# Patient Record
Sex: MALE | Race: ASIAN | Employment: FULL TIME | URBAN - METROPOLITAN AREA
[De-identification: names, ages, dates, MRNs, and addresses within clinical notes are randomized per-mention and may not be internally consistent; named-entity substitution may affect disease eponyms.]

---

## 2020-12-03 ENCOUNTER — OFFICE VISIT (OUTPATIENT)
Dept: NEUROLOGY | Age: 25
End: 2020-12-03
Payer: COMMERCIAL

## 2020-12-03 VITALS
HEIGHT: 72 IN | WEIGHT: 194 LBS | OXYGEN SATURATION: 98 % | RESPIRATION RATE: 16 BRPM | SYSTOLIC BLOOD PRESSURE: 130 MMHG | BODY MASS INDEX: 26.28 KG/M2 | HEART RATE: 78 BPM | DIASTOLIC BLOOD PRESSURE: 70 MMHG

## 2020-12-03 DIAGNOSIS — G40.909 NONINTRACTABLE EPILEPSY WITHOUT STATUS EPILEPTICUS, UNSPECIFIED EPILEPSY TYPE (HCC): Primary | ICD-10-CM

## 2020-12-03 PROCEDURE — 99204 OFFICE O/P NEW MOD 45 MIN: CPT | Performed by: PSYCHIATRY & NEUROLOGY

## 2020-12-03 RX ORDER — LEVETIRACETAM 500 MG/1
1000 TABLET ORAL 2 TIMES DAILY
Qty: 120 TAB | Refills: 5 | Status: SHIPPED | OUTPATIENT
Start: 2020-12-03 | End: 2021-12-09 | Stop reason: SDUPTHER

## 2020-12-03 RX ORDER — LEVETIRACETAM 500 MG/1
1000 TABLET ORAL 2 TIMES DAILY
COMMUNITY
End: 2020-12-03 | Stop reason: SDUPTHER

## 2020-12-03 NOTE — PROGRESS NOTES
Neurology Consult Note      HISTORY PROVIDED BY: patient    Chief Complaint:   Chief Complaint   Patient presents with    Epilepsy      Subjective:    Marga Magdaleno is a 22 y.o. right handed male who presents in consultation for epilepsy. Pt reports h/o epilepsy for 6-7 years, diagnosed while living in [facility] at age 14yo. He was started on Keppra 500mg bid. He recalls having an MRI brain at diagnosis, told he had \"clotting in brain\", but had another MRI a few years ago in [place] that was normal. EEG showed a \"slight disturbance in electric waves. \" His last sz was in August, 2018. Often triggered by stress. Typical seizure consists of a warning of feeling like he is going to pass out, about 5 seconds later he has YAA, he becomes aware again after 20 minutes, takes about 5 minutes before he recognizes people around him. He has been told he falls to ground, shakes for 5 minutes probably, will then stand up and move around, tries to get away from people, will talk, but has no memory the events. No tongue biting. No b/b incontinence. No family h/o epilepsy, no h/o febrile sz, no h/o head injury, no h/o CNS infection, nl preg/del. Also mentions that he slipped on stairs and hit head on wall recently. He dented the wall, no LOC. That same day he hit his head on a door, no LOC. Occasionally will feel numbness in left leg since hitting his head. Also mentions feeling \"rodriguez\" going through his body from head to toe and feels like he is going to black out, is able to abort the LOC. He mentions at end of visit that he has taken a PRN med for insomnia due to anxiety. Past Medical History:   Diagnosis Date    Epilepsy [facility])       History reviewed. No pertinent surgical history.    Social History     Socioeconomic History    Marital status: Not on file     Spouse name: Not on file    Number of children: Not on file    Years of education: Not on file    Highest education level: Not on file   Occupational History  Occupation:  for [employer]   Social Needs    Financial resource strain: Not on file    Food insecurity     Worry: Not on file     Inability: Not on file   [employer] needs     Medical: Not on file     Non-medical: Not on file   Tobacco Use    Smoking status: Never Smoker    Smokeless tobacco: Never Used   Substance and Sexual Activity    Alcohol use: Never     Frequency: Never    Drug use: Never    Sexual activity: Not on file   Lifestyle    Physical activity     Days per week: Not on file     Minutes per session: Not on file    Stress: Not on file   Relationships    Social connections     Talks on phone: Not on file     Gets together: Not on file     Attends [employer]ist service: Not on file     Active member of club or organization: Not on file     Attends meetings of clubs or organizations: Not on file     Relationship status: Not on file    Intimate partner violence     Fear of current or ex partner: Not on file     Emotionally abused: Not on file     Physically abused: Not on file     Forced sexual activity: Not on file   Other Topics Concern    Not on file   Social History Narrative    Living with friends in [place]     Family History   Problem Relation Age of Onset    No Known Problems Mother     No Known Problems Father     No Known Problems Sister        Objective:   Review of Systems   Constitutional: Negative. HENT: Negative. Eyes: Negative. Respiratory: Negative. Snores, no apneas   Cardiovascular: Negative. Gastrointestinal: Negative. Genitourinary: Negative. Musculoskeletal: Negative. Skin: Negative. Neurological: Positive for headaches (No N/V, P/P). Endo/Heme/Allergies: Negative. Psychiatric/Behavioral: The patient is nervous/anxious. No Known Allergies     Meds:  Outpatient Medications Prior to Visit   Medication Sig Dispense Refill    levETIRAcetam (KEPPRA) 500 mg tablet Take  by mouth two (2) times a day.        No facility-administered medications prior to visit. Imaging:  MRI Results (most recent):  No results found for this or any previous visit. CT Results (most recent):  No results found for this or any previous visit. Reviewed records in [employer] [facility] [employer] tab today    Lab Review   No results found for this or any previous visit. Exam:  Visit Vitals  /70 (BP 1 Location: Left arm, BP Patient Position: Sitting)   Pulse 78   Resp 16   Ht 6' (1.829 m)   Wt 194 lb (88 kg)   SpO2 98%   BMI 26.31 kg/m²     General:  Alert, cooperative, no distress. Head:  Normocephalic, without obvious abnormality, atraumatic. Respiratory:  Heart:   Non labored breathing  Regular rate and rhythm, no murmurs   Neck:   2+ carotids, no bruits   Extremities: Warm, no cyanosis or edema. Pulses: 2+ radial pulses. Neurologic:  MS: Alert and oriented x 4, speech intact. Language intact. Attention and fund of knowledge appropriate. Recent and remote memory intact. Cranial Nerves:  II: visual fields Full to confrontation   II: pupils Equal, round, reactive to light   II: optic disc    III,VII: ptosis none   III,IV,VI: extraocular muscles  EOMI, no nystagmus or diplopia   V: facial light touch sensation  normal   VII: facial muscle function   symmetric   VIII: hearing intact   IX: soft palate elevation  normal   XI: trapezius strength  5/5   XI: sternocleidomastoid strength 5/5   XII: tongue  Midline     Motor: normal bulk and tone, no tremor              Strength: 5/5 throughout, no PD  Sensory: intact to LT, PP  Coordination: FTN and HTS intact, CARLOS ALBERTO intact  Gait: normal gait, able to tandem walk  Reflexes: 2+ symmetric, toes downgoing           Assessment/Plan   Pt is a 22 y.o. right handed male diagnosed with epilepsy at age 14yo while living in [facility], epilepsy type or etiology is not known, no known seizure risk factors. Last seizure in August, 2018.   Typical seizure consists of a warning of feeling like he is going to pass out, about 5 seconds before having YAA/LOC, has been told he falls to ground and shakes for 5 minutes, will then stand up and move around, tries to get away from people, will talk, but has no memory these events. it takes 20 minutes for him to come to and it takes about 5 minutes before he recognizes people around him. May be triggered by stress. Currently on Keppra 1000mg bid. Exam is non-focal and unremarkable. Discussed establishing diagnosis or etiology if possible. Will order EEG. Will defer MRI given prior imaging and normal exam, pending EEG results. Pt is instructed to call after EEG is completed. Continue Keppra 1000mg bid. Discussed driving regulations in [place], no driving until seizure free x 6 months if he were to have a breakthrough seizure. F/u in clinic in 6 months, instructed to call if needed in the interim. ICD-10-CM ICD-9-CM    1. Nonintractable epilepsy without status epilepticus, unspecified [facility]  G40.909 345.90 EEG       Signed:   Herve Diaz MD  12/3/2020

## 2020-12-14 ENCOUNTER — HOSPITAL ENCOUNTER (OUTPATIENT)
Dept: NEUROLOGY | Age: 25
Discharge: HOME | End: 2020-12-14
Attending: PSYCHIATRY & NEUROLOGY
Payer: COMMERCIAL

## 2020-12-14 DIAGNOSIS — G40.909 NONINTRACTABLE EPILEPSY WITHOUT STATUS EPILEPTICUS, UNSPECIFIED EPILEPSY TYPE (HCC): ICD-10-CM

## 2020-12-14 PROCEDURE — 95816 EEG AWAKE AND DROWSY: CPT

## 2020-12-14 PROCEDURE — 95819 EEG AWAKE AND ASLEEP: CPT | Performed by: PSYCHIATRY & NEUROLOGY

## 2021-01-12 ENCOUNTER — TELEPHONE (OUTPATIENT)
Dept: NEUROLOGY | Age: 26
End: 2021-01-12

## 2021-01-13 NOTE — TELEPHONE ENCOUNTER
[facility] - Please call pt: EEG 12/14/20 was normal.  As we discussed, a normal EEG does not exclude a diagnosis of epilepsy. We can discuss further at f/u appt.

## 2021-01-13 NOTE — PROCEDURES
PROCEDURE: ROUTINE OUTPATIENT EEG  NAME:   Celina Zamora  ACCOUNT NUMBER : [de-identified]  MRN:   [id number]  DATE OF SERVICE: 12/14/20    HISTORY/INDICATION: Pt is a 22yo male with diagnosis of epilepsy at age 12yo, type of epilepsy or etiology not known. EEG is performed to help establish diagnosis. MEDICATIONS:   Current Outpatient Medications   Medication Sig Dispense Refill    levETIRAcetam (KEPPRA) 500 mg tablet Take 2 Tabs by mouth two (2) times a day. 120 Tab 5       CONDITIONS OF RECORDING: This is a routine 21-channel EEG recording performed in accordance with the international 10-20 system with one channel devoted to limited EKG. This study was done during states of wakefulness and sleep. Photic stimulation and hyperventilation were performed as activating procedures. DESCRIPTION:   Upon maximal arousal the posterior dominant rhythm has a frequency of 8.5Hz with an amplitude of 40uV. This activity is symmetric over the bilateral posterior derivations and attenuates with eye opening. Photic stimulation and hyperventilation do not significantly alter the tracing. Normal sleep architecture is seen with stage II sleep recognized by the presence of symmetric vertex waves and sleep spindles. There are no focal abnormalities, epileptiform discharges, or electrographic seizures seen. INTERPRETATION: Normal awake and asleep EEG    CLINICAL CORRELATION: A normal EEG does not definitively exclude a diagnosis of epilepsy if clinical suspicion is high consider sleep deprived EEG.      Tomás Robertson MD

## 2021-04-09 ENCOUNTER — TELEPHONE (OUTPATIENT)
Dept: NEUROLOGY | Age: 26
End: 2021-04-09

## 2021-04-09 NOTE — TELEPHONE ENCOUNTER
Spoke with pt. Pt stated on March 29 he was in [place] and was rush to hospital. He stated he was having a weird feeling while eating. It was a shivering feeling pt says it feels like a seizure lasting for 15 mins but he was able to control his body and was aware of his surrounding. He states this is a continuing feeling and it mainly happens at night when eating. When pt was at hospital in [place] had a CT Scan done Pt stated results were normal. Pt was told to contact his neurologist. Pt has a appt with Dr. Bello Arias in June but would like to be seen sooner.

## 2021-04-09 NOTE — TELEPHONE ENCOUNTER
Please advise    ----- Message from Bianca Rivera sent at 4/9/2021  3:09 PM EDT -----  Regarding: /telephone  General Message/Vendor Calls    Caller's first and last name:      Reason for call: The pt would like to know if there is a way his follow up could possibly be rescheduled to a sooner date. Callback required yes/no and why:Yes.       Best contact number(s):[phone]

## 2021-04-09 NOTE — TELEPHONE ENCOUNTER
Patient calling because he would like to be seen before his appt on 6/3. He had a medical issue recently and feels he needs to be seen ASAP. Can he see NP? Please advise.

## 2021-04-15 ENCOUNTER — OFFICE VISIT (OUTPATIENT)
Dept: NEUROLOGY | Age: 26
End: 2021-04-15
Payer: COMMERCIAL

## 2021-04-15 VITALS
HEART RATE: 91 BPM | DIASTOLIC BLOOD PRESSURE: 70 MMHG | SYSTOLIC BLOOD PRESSURE: 130 MMHG | OXYGEN SATURATION: 96 % | WEIGHT: 196.6 LBS | BODY MASS INDEX: 26.63 KG/M2 | HEIGHT: 72 IN

## 2021-04-15 DIAGNOSIS — G40.909 NONINTRACTABLE EPILEPSY WITHOUT STATUS EPILEPTICUS, UNSPECIFIED EPILEPSY TYPE (HCC): Primary | ICD-10-CM

## 2021-04-15 PROCEDURE — 99214 OFFICE O/P EST MOD 30 MIN: CPT | Performed by: PSYCHIATRY & NEUROLOGY

## 2021-04-15 RX ORDER — SERTRALINE HYDROCHLORIDE 25 MG/1
25 TABLET, FILM COATED ORAL DAILY
Qty: 90 TAB | Refills: 1 | Status: SHIPPED | OUTPATIENT
Start: 2021-04-15

## 2021-04-15 NOTE — PROGRESS NOTES
Neurology Consult Note      HISTORY PROVIDED BY: patient    Chief Complaint:   Chief Complaint   Patient presents with    Follow-up    Seizure      Subjective:   Pt is a 22 y.o. right handed male initially and last seen on 12/3/20, diagnosed with epilepsy at age 14yo while living in [facility], epilepsy type or etiology is not known, no known seizure risk factors. Last seizure in August, 2018. Typical seizure consists of a warning of feeling like he is going to pass out, about 5 seconds before having YAA/LOC, has been told he falls to ground and shakes for 5 minutes, will then stand up and move around, tries to get away from people, will talk, but has no memory these events. it takes 20 minutes for him to come to and it takes about 5 minutes before he recognizes people around him. May be triggered by stress. Taking Keppra 1000mg bid. Exam was non-focal and unremarkable. Discussed establishing diagnosis or etiology if possible. Ordered EEG, deferred MRI given prior imaging and normal exam, pending EEG results. Pt was instructed to call after EEG is completed. Continued Keppra 1000mg bid. Discussed driving regulations in [facility], no driving until seizure free x 6 months if he were to have a breakthrough seizure. He returns for f/u. EEG 12/14/20 was normal. Pt returns for earlier than planned f/u due to possible seizure while in [place] visiting friends. According to ED note 3/29/21, he had shivering on the inside x 30 minutes, felt like a seizure was coming on and instructed a friend to call EMS. He never had YAA/LOC with the event. CTH was normal. Keppra level was 23. CMP was unremarkable. CBC with WBC count 12.1. Pt was given Keppra 1000mg IV x 1 and discharged. The pt tells me that he had a panic attack, he was in [place], was feeling a weird feeling when eating especially for the previous couple of days.  About 7 out of 8 times in the past he had a seizure while eating, so he will often have anxiety feeling while eating, but this time was more severe. He wonders if this is related to new environment, change in job, spicy food, and not working out while in [place]. Typically when he has this feeling he will try to lie down. Was having this rarely since 2018 until last couple of months, occurring more frequently. With this one, it was more severe and continued despite lying down, felt like he might suffocate, tried his best not to pass out, has in the past. He felt like he was shivering on the inside and heart was racing. Lasted about 15-20 minutes in total. As soon as he reached the ED he was good again. He is not certain if this is a panic attack or an aura, b/c with his seizures he will have these sxs then have LOC. Had not missed any Keppra doses, not ill. He was having increased anxiety that day related to getting a test result for work back. He has been on anxiety medication in the past and it made him feel better. He is seeing an endocrinologist, Dr. Neva Boucher at [facility], his prolactin level was elevated x 2, as high as 37 and had an elevated to Alk phos. He has an  MRI brain/pituitary ordered. Past Medical History:   Diagnosis Date    Epilepsy ([facility]       No past surgical history on file.    Social History     Socioeconomic History    Marital status: SINGLE     Spouse name: Not on file    Number of children: Not on file    Years of education: Not on file    Highest education level: Not on file   Occupational History    Occupation:  for [employer]   Social Needs    Financial resource strain: Not on file    Food insecurity     Worry: Not on file     Inability: Not on file   [employer] needs     Medical: Not on file     Non-medical: Not on file   Tobacco Use    Smoking status: Never Smoker    Smokeless tobacco: Never Used   Substance and Sexual Activity    Alcohol use: Never     Frequency: Never    Drug use: Never    Sexual activity: Not on file   Lifestyle    Physical activity     Days per week: Not on file     Minutes per session: Not on file    Stress: Not on file   Relationships    Social connections     Talks on phone: Not on file     Gets together: Not on file     Attends Cheondoism service: Not on file     Active member of club or organization: Not on file     Attends meetings of clubs or organizations: Not on file     Relationship status: Not on file    Intimate partner violence     Fear of current or ex partner: Not on file     Emotionally abused: Not on file     Physically abused: Not on file     Forced sexual activity: Not on file   Other Topics Concern    Not on file   Social History Narrative    Living with friends in [place]     Family History   Problem Relation Age of Onset    No Known Problems Mother     No Known Problems Father     No Known Problems Sister        Objective:   ROS: Per HPI , o/w reviewed and negative. No Known Allergies     Meds:  Outpatient Medications Prior to Visit   Medication Sig Dispense Refill    levETIRAcetam (KEPPRA) 500 mg tablet Take 2 Tabs by mouth two (2) times a day. 120 Tab 5     No facility-administered medications prior to visit. Imaging:  MRI Results (most recent):  No results found for this or any previous visit. CT Results (most recent):  No results found for this or any previous visit. Reviewed records in [employer] tab today    Lab Review   No results found for this or any previous visit. Exam:  Visit Vitals  /70   Pulse 91   Ht 6' (1.829 m)   Wt 196 lb 9.6 oz (89.2 kg)   SpO2 96%   BMI 26.66 kg/m²     General:  Alert, cooperative, no distress. Head:  Normocephalic, without obvious abnormality, atraumatic. Respiratory:  Heart:   Non labored breathing  Regular rhythm, tachycardic, no murmurs   Neck:      Extremities: Warm, no cyanosis or edema. Pulses: 2+ radial pulses. Neurologic:  MS: Alert and oriented x 4, speech intact. Language intact.  Attention and fund of knowledge appropriate. Recent and remote memory intact. Cranial Nerves:  II: visual fields    II: pupils    II: optic disc    III,VII: ptosis none   III,IV,VI: extraocular muscles  EOMI, no nystagmus or diplopia   V: facial light touch sensation     VII: facial muscle function   symmetric   VIII: hearing intact   IX: soft palate elevation     XI: trapezius strength     XI: sternocleidomastoid strength    XII: tongue     Gait: normal gait  Exam 12/3/20:  Motor: normal bulk and tone, no tremor              Strength: 5/5 throughout, no PD  Sensory: intact to LT, PP  Coordination: FTN and HTS intact, CARLOS ALBERTO intact  Reflexes: 2+ symmetric, toes downgoing       Assessment/Plan   Pt is a 22 y.o. right handed male initially presenting 12/3/20, diagnosed with epilepsy at age 12yo while living in [facility], epilepsy type or etiology is not known, no known seizure risk factors. Last seizure in August, 2018. Typical seizure consists of a warning of feeling like he is going to pass out, about 5 seconds before having YAA/LOC, has been told he falls to ground and shakes for 5 minutes, will then stand up and move around, tries to get away from people, will talk, but has no memory these events. it takes 20 minutes for him to come to and it takes about 5 minutes before he recognizes people around him. May be triggered by stress and adds that several have occurred while eating. Taking Keppra 1000mg bid. Had a spell on 3/29/21 while eating in which he felt like he was shivering on the inside and like he might suffocate and heart was racing, it felt like a seizure was coming on vs anxiety that he may have a seizure while eating. He was under increased stress during this time. CTH was normal, Keppra level 23, WBC 12.1, o/w labs normal.  EEG 12/14/20 was normal.   Exam is non-focal and unremarkable. Unclear if this was an aura or panic attack.  Given h/o anxiety and previous improvement with treatment, will start Zoloft 25mg daily, side effects reviewed. Will await MRI brain/pituitary results. Continue Keppra 1000mg bid. Discussed driving regulations in [place], no driving until seizure free x 6 months if he were to have a breakthrough seizure. F/u in clinic in 3 months, instructed to call if needed in the interim. ICD-10-CM ICD-9-CM    1. Nonintractable epilepsy without status epilepticus, unspecified epilepsy type ([facility]  G40.909 345.90        Signed:   Jose Knowles MD  4/15/2021

## 2021-05-04 ENCOUNTER — TELEPHONE (OUTPATIENT)
Dept: NEUROLOGY | Age: 26
End: 2021-05-04

## 2021-05-04 NOTE — TELEPHONE ENCOUNTER
CHRISTOPHER Urbina 2 - 1) Document that you spoke to the pt and what was said. 2) Call the facility that did the imaging and see if they will send a copy of the report to me.

## 2021-05-04 NOTE — TELEPHONE ENCOUNTER
----- Message from Miguel Navarro sent at 5/4/2021 11:05 AM EDT -----  Regarding: Dr. Ade Fernandez  General Message/Vendor Calls    Caller's first and last name:   PT      Reason for call:  Requesting the office contact [phone] to request a copy of pt's MRI of Brain with and without contrast done on 04/29/21      Callback required yes/no and why:  Yes, confirming the copy was received      Best contact number(s):[phone]      Details to clarify the request:   Pt stated,  he received the CD       Miguel Navarro

## 2021-05-05 NOTE — TELEPHONE ENCOUNTER
Called the [facility] and spoke w/ Byron Mojica regarding obtaining a copy of pt's MRI of the Brain that was done on 4/29/21. She stated that the pt should call ahead in order to get copies ready or the pt needs to come to the Neurology clinic and sign a medical release form. Called pt and inform him of the message from Byron Mojica from [facility] stated (either go there to get the copies or come to Neurology clin to sign medical release form) Pt stated that he will bring the cd of his MRI by the office.

## 2021-05-05 NOTE — TELEPHONE ENCOUNTER
Called pt on 5/4/21. Pt stated that our office has to contact [facility] regarding a copy of pt's MRI of the Brain that was done on 4/29/21. Pt stated that he has the cd.

## 2021-05-06 NOTE — TELEPHONE ENCOUNTER
Charito Malin - Please have him sign a release when he comes by so that we can request the official report.

## 2021-05-06 NOTE — TELEPHONE ENCOUNTER
Called pt regarding signing a medical release form to received official report of MRI. Pt stated that he will be here next week to sign form.  Inform him that the form will be up front in Dr. Mario Serrano folder and to ask the PSR's

## 2021-05-11 ENCOUNTER — TELEPHONE (OUTPATIENT)
Dept: NEUROLOGY | Age: 26
End: 2021-05-11

## 2021-05-11 NOTE — TELEPHONE ENCOUNTER
Pt called stating he had an MRI done ordered by a different doctor and he talk to Dr. Kevin Moore when he was in the office and she said that she would like to see the results, he said he has a fax number if you could call back if you find the MRI results

## 2021-05-16 ENCOUNTER — HOSPITAL ENCOUNTER (EMERGENCY)
Age: 26
Discharge: HOME | End: 2021-05-17
Attending: EMERGENCY MEDICINE
Payer: COMMERCIAL

## 2021-05-16 ENCOUNTER — APPOINTMENT (OUTPATIENT)
Dept: CT IMAGING | Age: 26
End: 2021-05-16
Attending: EMERGENCY MEDICINE
Payer: COMMERCIAL

## 2021-05-16 DIAGNOSIS — F41.1 ANXIETY STATE: ICD-10-CM

## 2021-05-16 DIAGNOSIS — R51.9 NONINTRACTABLE HEADACHE, UNSPECIFIED CHRONICITY PATTERN, UNSPECIFIED HEADACHE TYPE: Primary | ICD-10-CM

## 2021-05-16 DIAGNOSIS — R93.0 ABNORMAL CT OF THE HEAD: ICD-10-CM

## 2021-05-16 LAB
ALBUMIN SERPL-MCNC: 4.1 G/DL (ref 3.5–5)
ALBUMIN/GLOB SERPL: 1.1 {RATIO} (ref 1.1–2.2)
ALP SERPL-CCNC: 117 U/L (ref 45–117)
ALT SERPL-CCNC: 26 U/L (ref 12–78)
ANION GAP SERPL CALC-SCNC: 3 MMOL/L (ref 5–15)
AST SERPL-CCNC: 16 U/L (ref 15–37)
BASOPHILS # BLD: 0 K/UL (ref 0–0.1)
BASOPHILS NFR BLD: 0 % (ref 0–1)
BILIRUB SERPL-MCNC: 0.4 MG/DL (ref 0.2–1)
BUN SERPL-MCNC: 14 MG/DL (ref 6–20)
BUN/CREAT SERPL: 19 (ref 12–20)
CALCIUM SERPL-MCNC: 9 MG/DL (ref 8.5–10.1)
CHLORIDE SERPL-SCNC: 106 MMOL/L (ref 97–108)
CO2 SERPL-SCNC: 28 MMOL/L (ref 21–32)
CREAT SERPL-MCNC: 0.73 MG/DL (ref 0.7–1.3)
DIFFERENTIAL METHOD BLD: NORMAL
EOSINOPHIL # BLD: 0.2 K/UL (ref 0–0.4)
EOSINOPHIL NFR BLD: 3 % (ref 0–7)
ERYTHROCYTE [DISTWIDTH] IN BLOOD BY AUTOMATED COUNT: 11.5 % (ref 11.5–14.5)
GLOBULIN SER CALC-MCNC: 3.7 G/DL (ref 2–4)
GLUCOSE SERPL-MCNC: 101 MG/DL (ref 65–100)
HCT VFR BLD AUTO: 43.6 % (ref 36.6–50.3)
HGB BLD-MCNC: 14.8 G/DL (ref 12.1–17)
IMM GRANULOCYTES # BLD AUTO: 0 K/UL (ref 0–0.04)
IMM GRANULOCYTES NFR BLD AUTO: 0 % (ref 0–0.5)
LYMPHOCYTES # BLD: 2.7 K/UL (ref 0.8–3.5)
LYMPHOCYTES NFR BLD: 38 % (ref 12–49)
MAGNESIUM SERPL-MCNC: 2.4 MG/DL (ref 1.6–2.4)
MCH RBC QN AUTO: 30 PG (ref 26–34)
MCHC RBC AUTO-ENTMCNC: 33.9 G/DL (ref 30–36.5)
MCV RBC AUTO: 88.4 FL (ref 80–99)
MONOCYTES # BLD: 0.7 K/UL (ref 0–1)
MONOCYTES NFR BLD: 9 % (ref 5–13)
NEUTS SEG # BLD: 3.5 K/UL (ref 1.8–8)
NEUTS SEG NFR BLD: 50 % (ref 32–75)
NRBC # BLD: 0 K/UL (ref 0–0.01)
NRBC BLD-RTO: 0 PER 100 WBC
PLATELET # BLD AUTO: 241 K/UL (ref 150–400)
PMV BLD AUTO: 10 FL (ref 8.9–12.9)
POTASSIUM SERPL-SCNC: 3.8 MMOL/L (ref 3.5–5.1)
PROT SERPL-MCNC: 7.8 G/DL (ref 6.4–8.2)
RBC # BLD AUTO: 4.93 M/UL (ref 4.1–5.7)
SODIUM SERPL-SCNC: 137 MMOL/L (ref 136–145)
WBC # BLD AUTO: 7.1 K/UL (ref 4.1–11.1)

## 2021-05-16 PROCEDURE — 84443 ASSAY THYROID STIM HORMONE: CPT

## 2021-05-16 PROCEDURE — 36415 COLL VENOUS BLD VENIPUNCTURE: CPT

## 2021-05-16 PROCEDURE — 96374 THER/PROPH/DIAG INJ IV PUSH: CPT

## 2021-05-16 PROCEDURE — 70496 CT ANGIOGRAPHY HEAD: CPT

## 2021-05-16 PROCEDURE — 80053 COMPREHEN METABOLIC PANEL: CPT

## 2021-05-16 PROCEDURE — 83735 ASSAY OF MAGNESIUM: CPT

## 2021-05-16 PROCEDURE — 99285 EMERGENCY DEPT VISIT HI MDM: CPT

## 2021-05-16 PROCEDURE — 74011000636 HC RX REV CODE- 636: Performed by: EMERGENCY MEDICINE

## 2021-05-16 PROCEDURE — 93005 ELECTROCARDIOGRAM TRACING: CPT

## 2021-05-16 PROCEDURE — 85025 COMPLETE CBC W/AUTO DIFF WBC: CPT

## 2021-05-16 PROCEDURE — 70450 CT HEAD/BRAIN W/O DYE: CPT

## 2021-05-16 PROCEDURE — 74011250636 HC RX REV CODE- 250/636: Performed by: EMERGENCY MEDICINE

## 2021-05-16 RX ORDER — SODIUM CHLORIDE 0.9 % (FLUSH) 0.9 %
5-40 SYRINGE (ML) INJECTION EVERY 8 HOURS
Status: DISCONTINUED | OUTPATIENT
Start: 2021-05-16 | End: 2021-05-17 | Stop reason: HOSPADM

## 2021-05-16 RX ORDER — LORAZEPAM 2 MG/ML
0.5 INJECTION INTRAMUSCULAR
Status: COMPLETED | OUTPATIENT
Start: 2021-05-16 | End: 2021-05-16

## 2021-05-16 RX ORDER — SODIUM CHLORIDE 0.9 % (FLUSH) 0.9 %
5-40 SYRINGE (ML) INJECTION AS NEEDED
Status: DISCONTINUED | OUTPATIENT
Start: 2021-05-16 | End: 2021-05-17 | Stop reason: HOSPADM

## 2021-05-16 RX ADMIN — Medication 10 ML: at 23:11

## 2021-05-16 RX ADMIN — LORAZEPAM 0.5 MG: 2 INJECTION INTRAMUSCULAR; INTRAVENOUS at 22:42

## 2021-05-16 RX ADMIN — IOPAMIDOL 100 ML: 755 INJECTION, SOLUTION INTRAVENOUS at 23:13

## 2021-05-16 NOTE — Clinical Note
[address] 
[facility] EMERGENCY DEPT 
[address] [place] [address] [place] [id number] [phone] Work/School Note Date: 5/16/2021 To Whom It May concern: 
 
Akhil Michale Duverney was seen and treated today in the emergency room by the following provider(s): 
Attending Provider: Milla Garza MD.   
 
Akhil Michale Duverney is excused from work/school on 05/17/21 and 05/18/21. He is medically clear to return to work/school on 5/19/2021. Sincerely, Tim William MD

## 2021-05-17 ENCOUNTER — TELEPHONE (OUTPATIENT)
Dept: NEUROLOGY | Age: 26
End: 2021-05-17

## 2021-05-17 ENCOUNTER — DOCUMENTATION ONLY (OUTPATIENT)
Dept: NEUROLOGY | Age: 26
End: 2021-05-17

## 2021-05-17 VITALS
SYSTOLIC BLOOD PRESSURE: 118 MMHG | DIASTOLIC BLOOD PRESSURE: 68 MMHG | TEMPERATURE: 97.9 F | BODY MASS INDEX: 26.28 KG/M2 | HEIGHT: 72 IN | HEART RATE: 69 BPM | OXYGEN SATURATION: 98 % | WEIGHT: 194 LBS | RESPIRATION RATE: 19 BRPM

## 2021-05-17 LAB
ATRIAL RATE: 72 BPM
CALCULATED P AXIS, ECG09: 48 DEGREES
CALCULATED R AXIS, ECG10: 49 DEGREES
CALCULATED T AXIS, ECG11: 21 DEGREES
DIAGNOSIS, 93000: NORMAL
P-R INTERVAL, ECG05: 176 MS
Q-T INTERVAL, ECG07: 370 MS
QRS DURATION, ECG06: 86 MS
QTC CALCULATION (BEZET), ECG08: 405 MS
TSH SERPL DL<=0.05 MIU/L-ACNC: 4.11 UIU/ML (ref 0.36–3.74)
VENTRICULAR RATE, ECG03: 72 BPM

## 2021-05-17 RX ORDER — LORAZEPAM 0.5 MG/1
0.5 TABLET ORAL
Qty: 20 TAB | Refills: 0 | Status: SHIPPED | OUTPATIENT
Start: 2021-05-17

## 2021-05-17 NOTE — ED NOTES
Pt presents to the ED for dizziness and a headache. Pt states he feels like he is having a panic attack that started when he woke up 30 minutes ago. He also states numbness in his legs. Pt also experiencing SOB. Pt explains pain in the back of the head radiating to the shoulders.

## 2021-05-17 NOTE — TELEPHONE ENCOUNTER
Spoke w/ pt. Pt wanted Dr. Tej Thurman to know that he went to the hospital last night due to anxious and daily headaches. Pt states that the pain from his Headaches starts from the top of his head to the bottom of his neck. Pt states that he sometimes feel chills. Pt also mentions for the past few days he has been feeling numbness in his left leg and hands. Pt also had his MRI results faxed over and wanting Dr. Tej Thurman to take a look at them.

## 2021-05-17 NOTE — PROGRESS NOTES
Received report from [facility] imaging center MRI pituitary with and without contrast 4/29/2021 ordered by Barney Duran MD  There is a subtle focal area of hypoenhancement in the inferolateral left adenohypophysis highly suspicious for pituitary microadenoma in the given clinical setting. There is nonspecific deep white matter signal abnormalities throughout the white matter of the cerebral hemispheres which are atypical for patient age. This could be sequela of nonspecific infection, inflammation, demyelination or remote prior trauma. Similar findings were reported in patients with chronic migraine headaches as well. Clinical correlation advised. Imaging on a disc is not provided at this time.

## 2021-05-17 NOTE — ED PROVIDER NOTES
EMERGENCY DEPARTMENT HISTORY AND PHYSICAL EXAM      Date: 5/16/2021  Patient Name: Teena Zamora    History of Presenting Illness     Chief Complaint   Patient presents with    Headache       History Provided By: Patient    HPI: Monty Thurston, 32 y.o. male with PMHx significant for seizure disorder, anxiety presents to the ED with chief complaint of pain in the back of his head that radiates to his neck that has been intermittent for the past few months. He also feels like he is having an aura like he is getting ready to have a seizure or a panic attack. Patient has history of seizure disorder and takes Keppra and is followed by Michaela Jason from neurology. He recently had referral to endocrinologist to found that his prolactin levels were elevated and he had a pituitary MRI which showed a \"subtle focal area of hypoenhancement of the inferior lateral left adenohypophysis suspicious for pituitary adenoma\". Patient received this report and has not yet had a chance to follow-up regarding it and this is also making him very anxious. Today he had another episode where he felt very lightheaded and felt like he was having palpitations. He denies any nausea or vomiting. Patient complains of numbness and tingling in his left foot and hand. He denies any chest pain. His doctor prescribed him Zoloft to help with anxiety, but he has not been taking it consistently. He reports that [place] doctor told his mother it had something to do with a \"clot in the back of his head behind his ear\". PCP: None    No current facility-administered medications on file prior to encounter. Current Outpatient Medications on File Prior to Encounter   Medication Sig Dispense Refill    sertraline (ZOLOFT) 25 mg tablet Take 1 Tab by mouth daily. 90 Tab 1    levETIRAcetam (KEPPRA) 500 mg tablet Take 2 Tabs by mouth two (2) times a day.  [address] Tab 5       Past History     Past Medical History:  Past Medical History:   Diagnosis Date    Epilepsy [facility])        Past Surgical History:  History reviewed. No pertinent surgical history. Family History:  Family History   Problem Relation Age of Onset    No Known Problems Mother     No Known Problems Father     No Known Problems Sister        Social History:  Social History     Tobacco Use    Smoking status: Never Smoker    Smokeless tobacco: Never Used   Substance Use Topics    Alcohol use: Never     Frequency: Never    Drug use: Never       Allergies:  No Known Allergies      Review of Systems   Review of Systems   Constitutional: Positive for chills. Negative for fever. HENT: Negative for ear pain, facial swelling, rhinorrhea and sore throat. Respiratory: Positive for shortness of breath. Negative for chest tightness and wheezing. Cardiovascular: Positive for palpitations. Negative for chest pain. Gastrointestinal: Negative for abdominal pain, diarrhea, nausea and vomiting. Genitourinary: Negative for dysuria, flank pain and hematuria. Musculoskeletal: Positive for neck pain. Negative for back pain and myalgias. Skin: Negative for rash and wound. Neurological: Positive for weakness ( Generalized), light-headedness and headaches. Negative for syncope and facial asymmetry. Psychiatric/Behavioral: The patient is nervous/anxious. All other systems reviewed and are negative.         Physical Exam   General appearance - well nourished, well appearing, and in no distress, anxious  Eyes - pupils equal and reactive, extraocular eye movements intact  ENT - mucous membranes moist, pharynx normal without lesions, TMs clear bilaterally,  Neck - supple, no significant adenopathy; and right anterior neck tender to palpation right paracervical musculature   Chest - clear to auscultation, no wheezes, rales or rhonchi; non-tender to palpation  Heart - normal rate and regular rhythm, S1 and S2 normal, no murmurs noted  Abdomen - soft, nontender, nondistended, no masses or organomegaly  Musculoskeletal - no joint tenderness, deformity or swelling; normal ROM  Extremities - peripheral pulses normal, no pedal edema  Skin - normal coloration and turgor, no rashes  Neurological - alert, oriented x3, normal speech, no focal findings or movement disorder noted    Diagnostic Study Results     Labs -     Recent Results (from the past 12 hour(s))   CBC WITH AUTOMATED DIFF    Collection Time: 05/16/21 10:49 PM   Result Value Ref Range    WBC 7.1 4.1 - 11.1 K/uL    RBC 4.93 4.10 - 5.70 M/uL    HGB 14.8 12.1 - 17.0 g/dL    HCT 43.6 36.6 - 50.3 %    MCV 88.4 80.0 - 99.0 FL    MCH 30.0 26.0 - 34.0 PG    MCHC 33.9 30.0 - 36.5 g/dL    RDW 11.5 11.5 - 14.5 %    PLATELET 853 175 - 402 K/uL    MPV 10.0 8.9 - 12.9 FL    NRBC 0.0 0  WBC    ABSOLUTE NRBC 0.00 0.00 - 0.01 K/uL    NEUTROPHILS 50 32 - 75 %    LYMPHOCYTES 38 12 - 49 %    MONOCYTES 9 5 - 13 %    EOSINOPHILS 3 0 - 7 %    BASOPHILS 0 0 - 1 %    IMMATURE GRANULOCYTES 0 0.0 - 0.5 %    ABS. NEUTROPHILS 3.5 1.8 - 8.0 K/UL    ABS. LYMPHOCYTES 2.7 0.8 - 3.5 K/UL    ABS. MONOCYTES 0.7 0.0 - 1.0 K/UL    ABS. EOSINOPHILS 0.2 0.0 - 0.4 K/UL    ABS. BASOPHILS 0.0 0.0 - 0.1 K/UL    ABS. IMM. GRANS. 0.0 0.00 - 0.04 K/UL    DF AUTOMATED     METABOLIC PANEL, COMPREHENSIVE    Collection Time: 05/16/21 10:49 PM   Result Value Ref Range    Sodium 137 136 - 145 mmol/L    Potassium 3.8 3.5 - 5.1 mmol/L    Chloride 106 97 - 108 mmol/L    CO2 28 21 - 32 mmol/L    Anion gap 3 (L) 5 - 15 mmol/L    Glucose 101 (H) 65 - 100 mg/dL    BUN 14 6 - 20 MG/DL    Creatinine 0.73 0.70 - 1.30 MG/DL    BUN/Creatinine ratio 19 12 - 20      GFR est AA >60 >60 ml/min/1.73m2    GFR est non-AA >60 >60 ml/min/1.73m2    Calcium 9.0 8.5 - 10.1 MG/DL    Bilirubin, total 0.4 0.2 - 1.0 MG/DL    ALT (SGPT) 26 12 - 78 U/L    AST (SGOT) 16 15 - 37 U/L    Alk.  phosphatase 117 45 - 117 U/L    Protein, total 7.8 6.4 - 8.2 g/dL    Albumin 4.1 3.5 - 5.0 g/dL    Globulin 3.7 2.0 - 4.0 g/dL    A-G Ratio 1.1 1.1 - 2.2     MAGNESIUM    Collection Time: 05/16/21 10:49 PM   Result Value Ref Range    Magnesium 2.4 1.6 - 2.4 mg/dL   TSH 3RD GENERATION    Collection Time: 05/16/21 10:49 PM   Result Value Ref Range    TSH 4.11 (H) 0.36 - 3.74 uIU/mL   EKG, 12 LEAD, INITIAL    Collection Time: 05/16/21 11:12 PM   Result Value Ref Range    Ventricular Rate 72 BPM    Atrial Rate 72 BPM    P-R Interval 176 ms    QRS Duration 86 ms    Q-T Interval 370 ms    QTC Calculation (Bezet) 405 ms    Calculated P Axis 48 degrees    Calculated R Axis 49 degrees    Calculated T Axis 21 degrees    Diagnosis       Normal sinus rhythm  ST elevation, probably due to early repolarization  No previous ECGs available         Radiologic Studies -   CTA HEAD NECK W CONT   Final Result   No evidence of vascular disease. Hypodensity in the right   frontoparietal white matter. Recommend comparison with prior imaging if   available otherwise consider MRI for further assessment. CT HEAD WO CONT   Final Result   Hypodensity in the right frontal parietal subcortical white matter. Questionable   small focus in the left frontal lobe deep white matter. These are nonspecific   however unusual for a patient this age. Consider MRI for further assessment if   not already performed. CT Results  (Last 48 hours)               05/16/21 2350  CTA HEAD NECK W CONT Final result    Impression:  No evidence of vascular disease. Hypodensity in the right   frontoparietal white matter. Recommend comparison with prior imaging if   available otherwise consider MRI for further assessment. Narrative:  EXAM: CTA HEAD NECK W CONT       INDICATION: headache, numbness tingling, neck pain       COMPARISON: None. CONTRAST: 100 mL of Isovue-370. TECHNIQUE:  Unenhanced  images were obtained to localize the volume for   acquisition.   Multislice helical axial CT angiography was performed from the   aortic arch to the top of the head during uneventful rapid bolus intravenous   contrast administration. Coronal and sagittal reformations and 3D post   processing was performed. CT dose reduction was achieved through use of a   standardized protocol tailored for this examination and automatic exposure   control for dose modulation. FINDINGS:       CTA NECK   There is conventional three vessel arch anatomy. The bilateral subclavian,   common carotid, and internal carotid arteries are patent with no flow-limiting   stenosis. % of right carotid artery stenosis: No significant stenosis   % of left carotid artery stenosis: No significant stenosis       NASCET method was utilized for calculating stenosis. The vertebral arteries are codominant and patent. The cervical soft tissues are   unremarkable. There are degenerative changes of the cervical spine. CTA HEAD   The vertebral arteries are codominant. The basilar artery and its branches are   normal. The internal carotid, anterior cerebral, and middle cerebral arteries   are patent. There is no flow-limiting intracranial stenosis. There is no   aneurysm. There are no sizable posterior communicating arteries. Again noted is a hypodensity in the right frontoparietal subcortical white   matter. 05/16/21 2350  CT HEAD WO CONT Final result    Impression:  Hypodensity in the right frontal parietal subcortical white matter. Questionable   small focus in the left frontal lobe deep white matter. These are nonspecific   however unusual for a patient this age. Consider MRI for further assessment if   not already performed. Narrative:  EXAM: CT HEAD WO CONT       INDICATION: Rodriguez, dizziness       COMPARISON: None. CONTRAST: None. TECHNIQUE: Unenhanced CT of the head was performed using 5 mm images. Brain and   bone windows were generated. Coronal and sagittal reformats.  CT dose reduction   was achieved through use of a standardized protocol tailored for this   examination and automatic exposure control for dose modulation. FINDINGS:   The ventricles and sulci are normal in size, shape and configuration. .   Hypodensity in the right frontoparietal's white matter. Questionable small focus   in the left frontal deep white matter. There is no intracranial hemorrhage,   extra-axial collection, or mass effect. The basilar cisterns are open. No CT   evidence of acute infarct. The bone windows demonstrate no abnormalities. The visualized portions of the   paranasal sinuses and mastoid air cells are clear. CXR Results  (Last 48 hours)    None            Medical Decision Making   I am the first provider for this patient. I reviewed the vital signs, available nursing notes, past medical history, past surgical history, family history and social history. Vital Signs-Reviewed the patient's vital signs. Patient Vitals for the past 12 hrs:   Temp Pulse Resp BP SpO2   05/16/21 2330 -- 70 18 114/83 97 %   05/16/21 2201 97.9 °F (36.6 °C) 78 16 (!) 134/91 100 %       EKG: Normal sinus rhythm, 72 bpm, normal axis, normal NM, QRS, QTc intervals, ST elevation consistent with early repolarization    Records Reviewed: Nursing Notes and Old Medical Records    Provider Notes (Medical Decision Making):   Differential diagnosis: Anxiety, electrolyte abnormality, hyperthyroidism, carotid/vertebral dissection, arrhythmia  We will check CBC, CMP, EKG, magnesium, TSH, CTA of head and neck. ED Course:   Initial assessment performed. The patients presenting problems have been discussed, and they are in agreement with the care plan formulated and outlined with them. I have encouraged them to ask questions as they arise throughout their visit. Progress Notes:  ED Course as of May 17 0144   Mon May 17, 2021   0140 Case discussed with Dr. Freeman Carroll [facility] neurology).   Discussed with him the findings on the CT and CTA done here as well as the MRI of the pituitary that was ordered as an outpatient. Patient is feeling much better after Ativan in the ED. Dr. April Agustin states that patient may be discharged and can follow-up in their office this week. [AO]      ED Course User Index  [AO] Milla Garza MD       Disposition:  Discharge home    PLAN:  1. Current Discharge Medication List      START taking these medications    Details   LORazepam (Ativan) 0.5 mg tablet Take 1 Tab by mouth every eight (8) hours as needed for Anxiety. Max Daily Amount: 1.5 mg.  Qty: 20 Tab, Refills: 0  Start date: 5/17/2021    Associated Diagnoses: Anxiety state           2. Follow-up Information     Follow up With Specialties Details Why Contact Info    [facility] EMERGENCY DEPT Emergency Medicine  If symptoms worsen [address]   [address] [place] [address]    Tiffany Bradley MD Neurology Schedule an appointment as soon as possible for a visit   [address] [facility]  [phone]          Return to ED if worse     Diagnosis     Clinical Impression:   1. Nonintractable headache, unspecified chronicity pattern, unspecified headache type    2. Anxiety state    3.  Abnormal CT of the head

## 2021-05-17 NOTE — ED NOTES
Loraine Craig MD at bedside for reeval;;     0209 - Patient discharge by Rosey Adamson MD - pt sent to the front lobby, with strong and steady gait -  Discharge information / home RX / and reasons to return to the ED were reviewed by the doctor.

## 2021-05-17 NOTE — TELEPHONE ENCOUNTER
----- Message from Alaina Maul[employer] sent at 5/17/2021 12:06 PM EDT -----  Regarding: Dr. Kelvin Patel telephone  General Message/Vendor Calls    Caller's first and last name: n/a       Reason for call: Emergency Room      Callback required yes/no and why: yes       Best contact number(s):[phone]        Details to clarify the request: Patient would like to be contacted as soon as possible today as he was seen in the [facility] [place] ER last night and would like to discuss concerns.        [address] [employer]

## 2021-05-17 NOTE — TELEPHONE ENCOUNTER
Anju - Please call pt: The CT head in ED showed a questionable spot in right frontal parietal region of brain. Given his new left sided numbness, I can order a new MRI brain. Or we can wait and discuss at his f/u appt in 16 days. I did received the report of MRI pituitary w/wo 4/29/21 from [address] [place] [address], but I do not have the CD to review. He should bring this to his f/u appt.

## 2021-05-17 NOTE — TELEPHONE ENCOUNTER
----- Message from [address] [employer] sent at 5/17/2021 12:06 PM EDT -----  Regarding: Dr. Guilherme Steele telephone  General Message/Vendor Calls    Caller's first and last name: n/a       Reason for call: Emergency Room      Callback required yes/no and why: yes       Best contact number(s):[phone]        Details to clarify the request: Patient would like to be contacted as soon as possible today as he was seen in the [facility] [place] ER last night and would like to discuss concerns.        [address] [employer]

## 2021-05-18 NOTE — TELEPHONE ENCOUNTER
Patient came and dropped off CD of his imaging. He is requesting to be worked in [employer]. States he is having problems walking.

## 2021-05-18 NOTE — TELEPHONE ENCOUNTER
Spoke w/ pt. Inform pt of the CT results. Pt states that he will bring the CD by office from [address] [place] [address] that was done on 4/29/21.  Pt also states that he will be moving to [address] [place] [address] the end of may/beginning of June and wanting to see if he can change his 7/15/21 appt to a virtual.

## 2021-05-20 ENCOUNTER — OFFICE VISIT (OUTPATIENT)
Dept: NEUROLOGY | Age: 26
End: 2021-05-20
Payer: COMMERCIAL

## 2021-05-20 VITALS
WEIGHT: 193 LBS | BODY MASS INDEX: 26.14 KG/M2 | HEIGHT: 72 IN | SYSTOLIC BLOOD PRESSURE: 112 MMHG | OXYGEN SATURATION: 98 % | HEART RATE: 68 BPM | DIASTOLIC BLOOD PRESSURE: 74 MMHG

## 2021-05-20 DIAGNOSIS — G40.909 NONINTRACTABLE EPILEPSY WITHOUT STATUS EPILEPTICUS, UNSPECIFIED EPILEPSY TYPE (HCC): ICD-10-CM

## 2021-05-20 DIAGNOSIS — D49.7 PITUITARY TUMOR: ICD-10-CM

## 2021-05-20 DIAGNOSIS — R90.89 ABNORMAL FINDING ON MRI OF BRAIN: Primary | ICD-10-CM

## 2021-05-20 PROCEDURE — 99214 OFFICE O/P EST MOD 30 MIN: CPT | Performed by: PSYCHIATRY & NEUROLOGY

## 2021-05-20 RX ORDER — CABERGOLINE 0.5 MG/1
TABLET ORAL
COMMUNITY
Start: 2021-04-30

## 2021-05-20 RX ORDER — DICLOFENAC SODIUM 75 MG/1
75 TABLET, DELAYED RELEASE ORAL 2 TIMES DAILY
COMMUNITY
Start: 2021-02-09 | End: 2021-05-20

## 2021-05-20 NOTE — PROGRESS NOTES
Chief Complaint   Patient presents with    Follow-up     history of seizures, stating \" I driopped of medical records for Dr. THOMAS [facility] to review, I am here to discuss them with her. \"     Visit Vitals  /74 (BP 1 Location: Right upper arm, BP Patient Position: Sitting)   Pulse 68   Ht 6' (1.829 m)   Wt 193 lb (87.5 kg)   SpO2 98%   BMI 26.18 kg/m²

## 2021-05-20 NOTE — PROGRESS NOTES
Neurology Consult Note      HISTORY PROVIDED BY: patient    Chief Complaint:   Chief Complaint   Patient presents with    Follow-up     history of seizures, stating \" I driopped of medical records for Dr. Janneth Aponte to review, I am here to discuss them with her. \"      Subjective:   Pt is a 32 y.o. right handed male last seen in clinic on 4/15/21 in f/u for epilepsy. Initially presenting 12/3/20, diagnosed with epilepsy at age 14yo while living in [facility], epilepsy type or etiology is not known, no known seizure risk factors. Last seizure in August, 2018. Typical seizure consists of a warning of feeling like he is going to pass out, about 5 seconds before having YAA/LOC, has been told he falls to ground and shakes for 5 minutes, will then stand up and move around, tries to get away from people, will talk, but has no memory these events. it takes 20 minutes for him to come to and it takes about 5 minutes before he recognizes people around him. May be triggered by stress and adds that several have occurred while eating. Taking Keppra 1000mg bid. Had a spell on 3/29/21 while eating in which he felt like he was shivering on the inside and like he might suffocate and heart was racing, it felt like a seizure was coming on vs anxiety that he may have a seizure while eating. He was under increased stress during this time. CTH was normal, Keppra level 23, WBC 12.1, o/w labs normal.  EEG 12/14/20 was normal.   Exam was non-focal and unremarkable. Unclear if this was an aura or panic attack. Given h/o anxiety and previous improvement with treatment, will started Zoloft 25mg daily. Will await MRI brain/pituitary results. Continued Keppra 1000mg bid. Discussed driving regulations in [place], no driving until seizure free x 6 months if he were to have a breakthrough seizure. He returns for f/u.  Received report from [id number] [facility] MRI pituitary with and without contrast 4/29/2021 ordered by Sonal Rea MD  There is a subtle focal area of hypoenhancement in the inferolateral left adenohypophysis highly suspicious for pituitary microadenoma in the given clinical setting. There is nonspecific deep white matter signal abnormalities throughout the white matter of the cerebral hemispheres which are atypical for patient age. This could be sequela of nonspecific infection, inflammation, demyelination or remote prior trauma. Similar findings were reported in patients with chronic migraine headaches as well. He has hyperprolactinemia and is taking cabergoline. He is not taking Zoloft as prescribed, only using it \"as needed. \"  He mentions pain in posterior head region going down neck, fatigue, dizziness, blurred vision, numbness in left leg at times. Mentions all of these sxs are affecting his daily activities. Report from MRI brain 8/28/15 with T2/Flair hyperintensities that sound very similar to current MRI findings. He did not have a pituitary tumor reported on that imaging. There was a discrete lesion that dropped out on fat sat images c/w dermoid, this is also present on recent MRI, but not mentioned in report. Recalls having typhoid fever as an adolescent, also has had HAs since childhood. Past Medical History:   Diagnosis Date    Epilepsy ([facility] [address]       No past surgical history on file.    Social History     Socioeconomic History    Marital status: SINGLE     Spouse name: Not on file    Number of children: Not on file    Years of education: Not on file    Highest education level: Not on file   Occupational History    Occupation:  for [employer]   Tobacco Use    Smoking status: Never Smoker    Smokeless tobacco: Never Used   Vaping Use    Vaping Use: Never used   Substance and Sexual Activity    Alcohol use: Never    Drug use: Never    Sexual activity: Not on file   Other Topics Concern    Not on file   Social History Narrative    Living with friends in [address] [place] [address] Financial Resource Strain:     Difficulty of Paying Living Expenses:    Food Insecurity:     Worried About Running Out of Food in the Last Year:     [address] in the Last Year:    Transportation Needs:     Lack of Transportation (Medical):  Lack of Transportation (Non-Medical):    Physical Activity:     Days of Exercise per Week:     Minutes of Exercise per Session:    Stress:     Feeling of Stress :    Social Connections:     Frequency of Communication with Friends and Family:     Frequency of Social Gatherings with Friends and Family:     Attends Christian Services:     Active Member of Clubs or Organizations:     Attends Club or Organization Meetings:     Marital Status:    Intimate Partner Violence:     Fear of Current or Ex-Partner:     Emotionally Abused:     Physically Abused:     Sexually Abused:      Family History   Problem Relation Age of Onset    No Known Problems Mother     No Known Problems Father     No Known Problems Sister        Objective:   ROS: Per HPI , o/w reviewed and negative. No Known Allergies     Meds:  Outpatient Medications Prior to Visit   Medication Sig Dispense Refill    cabergoline (DOSTINEX) 0.5 mg tablet 1 tab(s)      LORazepam (Ativan) 0.5 mg tablet Take 1 Tab by mouth every eight (8) hours as needed for Anxiety. Max Daily Amount: 1.5 mg. 20 Tab 0    sertraline (ZOLOFT) 25 mg tablet Take 1 Tab by mouth daily. 90 Tab 1    levETIRAcetam (KEPPRA) 500 mg tablet Take 2 Tabs by mouth two (2) times a day. 120 Tab 5    diclofenac EC (VOLTAREN) 75 mg EC tablet Take 75 mg by mouth two (2) times a day. (Patient not taking: Reported on 5/20/2021)       No facility-administered medications prior to visit. Imaging:  MRI Results (most recent):  No results found for this or any previous visit.      CT Results (most recent):  Results from Hospital Encounter encounter on 05/16/21    CT HEAD WO CONT    Narrative  EXAM: CT HEAD WO CONT    INDICATION: [facility] , dizziness    COMPARISON: None. CONTRAST: None. TECHNIQUE: Unenhanced CT of the head was performed using 5 mm images. Brain and  bone windows were generated. Coronal and sagittal reformats. CT dose reduction  was achieved through use of a standardized protocol tailored for this  examination and automatic exposure control for dose modulation. FINDINGS:  The ventricles and sulci are normal in size, shape and configuration. .  Hypodensity in the right frontoparietal's white matter. Questionable small focus  in the left frontal deep white matter. There is no intracranial hemorrhage,  extra-axial collection, or mass effect. The basilar cisterns are open. No CT  evidence of acute infarct. The bone windows demonstrate no abnormalities. The visualized portions of the  paranasal sinuses and mastoid air cells are clear. Impression  Hypodensity in the right frontal parietal subcortical white matter. Questionable  small focus in the left frontal lobe deep white matter. These are nonspecific  however unusual for a patient this age. Consider MRI for further assessment if  not already performed. Reviewed records in [employer] tab today    Lab Review   Results for orders placed or performed during the hospital encounter of 05/16/21   CBC WITH AUTOMATED DIFF   Result Value Ref Range    WBC 7.1 4.1 - 11.1 K/uL    RBC 4.93 4.10 - 5.70 M/uL    HGB 14.8 12.1 - 17.0 g/dL    HCT 43.6 36.6 - 50.3 %    MCV 88.4 80.0 - 99.0 FL    MCH 30.0 26.0 - 34.0 PG    MCHC 33.9 30.0 - 36.5 g/dL    RDW 11.5 11.5 - 14.5 %    PLATELET 889 227 - 219 K/uL    MPV 10.0 8.9 - 12.9 FL    NRBC 0.0 0  WBC    ABSOLUTE NRBC 0.00 0.00 - 0.01 K/uL    NEUTROPHILS 50 32 - 75 %    LYMPHOCYTES 38 12 - 49 %    MONOCYTES 9 5 - 13 %    EOSINOPHILS 3 0 - 7 %    BASOPHILS 0 0 - 1 %    IMMATURE GRANULOCYTES 0 0.0 - 0.5 %    ABS. NEUTROPHILS 3.5 1.8 - 8.0 K/UL    ABS. LYMPHOCYTES 2.7 0.8 - 3.5 K/UL    ABS. MONOCYTES 0.7 0.0 - 1.0 K/UL    ABS. EOSINOPHILS 0.2 0.0 - 0.4 K/UL    ABS. BASOPHILS 0.0 0.0 - 0.1 K/UL    ABS. IMM. GRANS. 0.0 0.00 - 0.04 K/UL    DF AUTOMATED     METABOLIC PANEL, COMPREHENSIVE   Result Value Ref Range    Sodium 137 136 - 145 mmol/L    Potassium 3.8 3.5 - 5.1 mmol/L    Chloride 106 97 - 108 mmol/L    CO2 28 21 - 32 mmol/L    Anion gap 3 (L) 5 - 15 mmol/L    Glucose 101 (H) 65 - 100 mg/dL    BUN 14 6 - 20 MG/DL    Creatinine 0.73 0.70 - 1.30 MG/DL    BUN/Creatinine ratio 19 12 - 20      GFR est AA >60 >60 ml/min/1.73m2    GFR est non-AA >60 >60 ml/min/1.73m2    Calcium 9.0 8.5 - 10.1 MG/DL    Bilirubin, total 0.4 0.2 - 1.0 MG/DL    ALT (SGPT) 26 12 - 78 U/L    AST (SGOT) 16 15 - 37 U/L    Alk. phosphatase 117 45 - 117 U/L    Protein, total 7.8 6.4 - 8.2 g/dL    Albumin 4.1 3.5 - 5.0 g/dL    Globulin 3.7 2.0 - 4.0 g/dL    A-G Ratio 1.1 1.1 - 2.2     MAGNESIUM   Result Value Ref Range    Magnesium 2.4 1.6 - 2.4 mg/dL   TSH 3RD GENERATION   Result Value Ref Range    TSH 4.11 (H) 0.36 - 3.74 uIU/mL   EKG, 12 LEAD, INITIAL   Result Value Ref Range    Ventricular Rate 72 BPM    Atrial Rate 72 BPM    P-R Interval 176 ms    QRS Duration 86 ms    Q-T Interval 370 ms    QTC Calculation (Bezet) 405 ms    Calculated P Axis 48 degrees    Calculated R Axis 49 degrees    Calculated T Axis 21 degrees    Diagnosis       Normal sinus rhythm  ST elevation, probably due to early repolarization  No previous ECGs available  Confirmed by Guillermina Ryan ([id number]) on 5/17/2021 12:54:40 PM          Exam:  Visit Vitals  /74 (BP 1 Location: Right upper arm, BP Patient Position: Sitting)   Pulse 68   Ht 6' (1.829 m)   Wt 193 lb (87.5 kg)   SpO2 98%   BMI 26.18 kg/m²     General:  Alert, cooperative, no distress. Head:  Normocephalic, without obvious abnormality, atraumatic. Respiratory:  Heart:   Non labored breathing  Regular rhythm, tachycardic, no murmurs   Neck:      Extremities: Warm, no cyanosis or edema. Pulses: 2+ radial pulses. Neurologic:  MS: Alert and oriented x 4, speech intact. Language intact. Attention and fund of knowledge appropriate. Recent and remote memory intact. Cranial Nerves:  II: visual fields    II: pupils    II: optic disc    III,VII: ptosis none   III,IV,VI: extraocular muscles  EOMI, no nystagmus or diplopia   V: facial light touch sensation     VII: facial muscle function   symmetric   VIII: hearing intact   IX: soft palate elevation     XI: trapezius strength     XI: sternocleidomastoid strength    XII: tongue     Gait: normal gait  Exam 12/3/20:  Motor: normal bulk and tone, no tremor              Strength: 5/5 throughout, no PD  Sensory: intact to LT, PP  Coordination: FTN and HTS intact, CARLOS ALBERTO intact  Reflexes: 2+ symmetric, toes downgoing       Assessment/Plan   Pt is a 32 y.o. right handed male with epilepsy, initially presenting 12/3/20, diagnosed with epilepsy at age 14yo while living in [facility], epilepsy type or etiology is not known, no known seizure risk factors. Last seizure in August, 2018. Typical seizure consists of a warning of feeling like he is going to pass out, about 5 seconds before having YAA/LOC, has been told he falls to ground and shakes for 5 minutes, will then stand up and move around, tries to get away from people, will talk, but has no memory these events. it takes 20 minutes for him to come to and it takes about 5 minutes before he recognizes people around him. May be triggered by stress and adds that several have occurred while eating. Taking Keppra 1000mg bid. Had a spell on 3/29/21 while eating in which he felt like he was shivering on the inside and like he might suffocate and heart was racing, it felt like a seizure was coming on vs anxiety that he may have a seizure while eating. CTH was normal, Keppra level 23, WBC 12.1, o/w labs normal.  EEG 12/14/20 was normal.  New diagnosis of pituitary microadenoma with hyperprolactinemia.   MRI pituitary with and without contrast 4/29/2021 also revealed nonspecific deep white matter signal abnormalities throughout the white matter of the cerebral hemispheres which are atypical for patient age could be sequela of nonspecific infection, inflammation, demyelination or remote prior trauma. Report from MRI brain 8/28/15 with T2/Flair hyperintensities that sound very similar to current MRI findings. Exam is non-focal and unremarkable. Discussed appropriate use of Zoloft 25mg daily, not prn. Continue Keppra 1000mg bid. Recommend repeat MRI brain w/wo contrast in six months to reassess white matter lesions. F/u in clinic in 6 months, instructed to call if needed in the interim. ICD-10-CM ICD-9-CM    1. Abnormal finding on MRI of brain  R90.89 793.0 MRI BRAIN W WO CONT   2. Pituitary tumor  D49.7 239.7 MRI BRAIN W WO CONT   3. Nonintractable epilepsy without status epilepticus, unspecified epilepsy type ([facility] [address]  G40.909 345.90        Signed:   Bolivar Kunz MD  5/20/2021

## 2021-12-06 RX ORDER — LEVETIRACETAM 500 MG/1
1000 TABLET ORAL 2 TIMES DAILY
Qty: 120 TABLET | Refills: 0 | Status: SHIPPED | OUTPATIENT
Start: 2021-12-06 | End: 2021-12-09 | Stop reason: SDUPTHER

## 2021-12-06 NOTE — TELEPHONE ENCOUNTER
I ordered him a 1 month supply this weekend. He needs to contact Dr. Eder Bello for additional refills.

## 2021-12-07 NOTE — TELEPHONE ENCOUNTER
Erika Morales - Please call pt: He needs a f/u appt for additional refills. He no showed appt in November. I will refill Keppra to last until f/u.

## 2021-12-09 RX ORDER — LEVETIRACETAM 500 MG/1
1000 TABLET ORAL 2 TIMES DAILY
Qty: 120 TABLET | Refills: 4 | Status: SHIPPED | OUTPATIENT
Start: 2021-12-09 | End: 2022-06-17 | Stop reason: SDUPTHER

## 2021-12-09 NOTE — TELEPHONE ENCOUNTER
Called pt. Verified. Inform pt that Dr. Lorna Bailey states that he needs a f/u appt for additional refills and that she will refill keppra to last until f/u appt. Pt is scheduled for 5/9/22 at 10:20am w/ Dr. Lorna Bailey.

## 2022-06-17 ENCOUNTER — TELEPHONE (OUTPATIENT)
Dept: NEUROLOGY | Age: 27
End: 2022-06-17

## 2022-06-20 RX ORDER — LEVETIRACETAM 500 MG/1
1000 TABLET ORAL 2 TIMES DAILY
Qty: 360 TABLET | Refills: 0 | Status: SHIPPED | OUTPATIENT
Start: 2022-06-20

## 2022-06-20 NOTE — TELEPHONE ENCOUNTER
Claudia - Please call pt: He has not been seen in a year. He no showed appt 11/22/21. This is his last refill until he is seen in the office. He may request PCP to refill meds if he prefers.

## 2023-05-18 RX ORDER — SERTRALINE HYDROCHLORIDE 25 MG/1
25 TABLET, FILM COATED ORAL DAILY
COMMUNITY
Start: 2021-04-15

## 2023-05-18 RX ORDER — CABERGOLINE 0.5 MG/1
TABLET ORAL
COMMUNITY
Start: 2021-04-30

## 2023-05-18 RX ORDER — LEVETIRACETAM 500 MG/1
1000 TABLET ORAL 2 TIMES DAILY
COMMUNITY
Start: 2022-06-20

## 2023-05-18 RX ORDER — LORAZEPAM 0.5 MG/1
0.5 TABLET ORAL EVERY 8 HOURS PRN
COMMUNITY
Start: 2021-05-17